# Patient Record
Sex: FEMALE | Race: WHITE | NOT HISPANIC OR LATINO | Employment: PART TIME | URBAN - METROPOLITAN AREA
[De-identification: names, ages, dates, MRNs, and addresses within clinical notes are randomized per-mention and may not be internally consistent; named-entity substitution may affect disease eponyms.]

---

## 2023-08-11 ENCOUNTER — TELEPHONE (OUTPATIENT)
Dept: PRIMARY CARE | Facility: CLINIC | Age: 68
End: 2023-08-11
Payer: COMMERCIAL

## 2023-08-11 DIAGNOSIS — E78.00 ELEVATED LDL CHOLESTEROL LEVEL: ICD-10-CM

## 2023-08-11 DIAGNOSIS — D64.9 ANEMIA, UNSPECIFIED TYPE: ICD-10-CM

## 2023-08-11 PROBLEM — H93.19 TINNITUS: Status: ACTIVE | Noted: 2023-08-11

## 2023-08-11 PROBLEM — J30.9 ALLERGIC RHINITIS: Status: ACTIVE | Noted: 2023-08-11

## 2023-08-11 PROBLEM — R07.89 CHEST PRESSURE: Status: ACTIVE | Noted: 2023-08-11

## 2023-08-11 PROBLEM — R42 VERTIGO: Status: ACTIVE | Noted: 2023-08-11

## 2023-08-11 PROBLEM — I49.3 PREMATURE VENTRICULAR BEATS: Status: ACTIVE | Noted: 2023-08-11

## 2023-08-11 PROBLEM — R03.0 ELEVATED BLOOD PRESSURE READING: Status: ACTIVE | Noted: 2023-08-11

## 2023-08-11 PROBLEM — R51.9 HEADACHE: Status: ACTIVE | Noted: 2023-08-11

## 2023-08-11 PROBLEM — E04.9 THYROID ENLARGEMENT: Status: ACTIVE | Noted: 2023-08-11

## 2023-08-11 PROBLEM — R79.89 LOW TSH LEVEL: Status: ACTIVE | Noted: 2023-08-11

## 2023-08-11 PROBLEM — R09.89 BRUIT OF RIGHT CAROTID ARTERY: Status: ACTIVE | Noted: 2023-08-11

## 2023-08-11 PROBLEM — R00.1 BRADYCARDIA: Status: ACTIVE | Noted: 2023-08-11

## 2023-08-11 PROBLEM — L71.9 ROSACEA, ACNE: Status: ACTIVE | Noted: 2023-08-11

## 2023-08-11 PROBLEM — M71.349 SYNOVIAL CYST OF HAND: Status: ACTIVE | Noted: 2023-08-11

## 2023-08-11 PROBLEM — R76.8 ELEVATED ANTINUCLEAR ANTIBODY (ANA) LEVEL: Status: ACTIVE | Noted: 2023-08-11

## 2023-08-11 PROBLEM — M19.90 INFLAMMATORY ARTHRITIS: Status: ACTIVE | Noted: 2023-08-11

## 2023-08-11 PROBLEM — R00.2 PALPITATIONS: Status: ACTIVE | Noted: 2023-08-11

## 2023-08-11 RX ORDER — LOSARTAN POTASSIUM 100 MG/1
1 TABLET ORAL DAILY
COMMUNITY
Start: 2018-01-11

## 2023-08-11 RX ORDER — FLUTICASONE PROPIONATE 50 MCG
2 SPRAY, SUSPENSION (ML) NASAL DAILY
COMMUNITY
Start: 2017-01-26

## 2023-08-11 RX ORDER — NAPROXEN SODIUM 220 MG/1
1 TABLET, FILM COATED ORAL DAILY PRN
COMMUNITY
Start: 2018-01-19

## 2023-08-11 RX ORDER — ATORVASTATIN CALCIUM 40 MG/1
1 TABLET, FILM COATED ORAL DAILY
COMMUNITY
Start: 2013-11-26 | End: 2023-08-11 | Stop reason: SDUPTHER

## 2023-08-11 RX ORDER — METRONIDAZOLE 10 MG/G
GEL TOPICAL 2 TIMES DAILY
COMMUNITY
Start: 2015-02-19

## 2023-08-11 RX ORDER — METOPROLOL TARTRATE 25 MG/1
TABLET, FILM COATED ORAL
COMMUNITY
Start: 2023-08-08

## 2023-08-11 RX ORDER — ATORVASTATIN CALCIUM 40 MG/1
40 TABLET, FILM COATED ORAL DAILY
Qty: 90 TABLET | Refills: 1 | Status: SHIPPED | OUTPATIENT
Start: 2023-08-11

## 2023-08-11 NOTE — TELEPHONE ENCOUNTER
Pt requesting refill of the following medication. Pt has about 2 weeks of medication left and has scheduled PE appointment for 10/24.      Atorvastatin Calcium  40 MG  1qd  #90  Refill? ?    [facility] # [phone]  [facility] # [phone]

## 2023-10-19 ENCOUNTER — LAB (OUTPATIENT)
Dept: LAB | Facility: LAB | Age: 68
End: 2023-10-19
Payer: COMMERCIAL

## 2023-10-19 DIAGNOSIS — E78.00 ELEVATED LDL CHOLESTEROL LEVEL: ICD-10-CM

## 2023-10-19 DIAGNOSIS — E89.0 POSTPROCEDURAL HYPOTHYROIDISM: Primary | ICD-10-CM

## 2023-10-19 DIAGNOSIS — D64.9 ANEMIA, UNSPECIFIED TYPE: ICD-10-CM

## 2023-10-19 LAB
ALBUMIN SERPL BCP-MCNC: 4.7 G/DL (ref 3.4–5)
ALP SERPL-CCNC: 81 U/L (ref 33–136)
ALT SERPL W P-5'-P-CCNC: 13 U/L (ref 7–45)
ANION GAP SERPL CALC-SCNC: 14 MMOL/L (ref 10–20)
AST SERPL W P-5'-P-CCNC: 18 U/L (ref 9–39)
BASOPHILS # BLD AUTO: 0.07 X10*3/UL (ref 0–0.1)
BASOPHILS NFR BLD AUTO: 1.5 %
BILIRUB SERPL-MCNC: 0.7 MG/DL (ref 0–1.2)
BUN SERPL-MCNC: 18 MG/DL (ref 6–23)
CALCIUM SERPL-MCNC: 9.8 MG/DL (ref 8.6–10.6)
CHLORIDE SERPL-SCNC: 106 MMOL/L (ref 98–107)
CHOLEST SERPL-MCNC: 178 MG/DL (ref 0–199)
CHOLESTEROL/HDL RATIO: 2.3
CO2 SERPL-SCNC: 26 MMOL/L (ref 21–32)
CREAT SERPL-MCNC: 0.78 MG/DL (ref 0.5–1.05)
EOSINOPHIL # BLD AUTO: 0.2 X10*3/UL (ref 0–0.7)
EOSINOPHIL NFR BLD AUTO: 4.4 %
ERYTHROCYTE [DISTWIDTH] IN BLOOD BY AUTOMATED COUNT: 13.4 % (ref 11.5–14.5)
GFR SERPL CREATININE-BSD FRML MDRD: 82 ML/MIN/1.73M*2
GLUCOSE SERPL-MCNC: 85 MG/DL (ref 74–99)
HCT VFR BLD AUTO: 39.9 % (ref 36–46)
HDLC SERPL-MCNC: 76.3 MG/DL
HGB BLD-MCNC: 12.9 G/DL (ref 12–16)
IMM GRANULOCYTES # BLD AUTO: 0.01 X10*3/UL (ref 0–0.7)
IMM GRANULOCYTES NFR BLD AUTO: 0.2 % (ref 0–0.9)
LDLC SERPL CALC-MCNC: 92 MG/DL
LYMPHOCYTES # BLD AUTO: 1.78 X10*3/UL (ref 1.2–4.8)
LYMPHOCYTES NFR BLD AUTO: 38.9 %
MCH RBC QN AUTO: 30 PG (ref 26–34)
MCHC RBC AUTO-ENTMCNC: 32.3 G/DL (ref 32–36)
MCV RBC AUTO: 93 FL (ref 80–100)
MONOCYTES # BLD AUTO: 0.42 X10*3/UL (ref 0.1–1)
MONOCYTES NFR BLD AUTO: 9.2 %
NEUTROPHILS # BLD AUTO: 2.09 X10*3/UL (ref 1.2–7.7)
NEUTROPHILS NFR BLD AUTO: 45.8 %
NON HDL CHOLESTEROL: 102 MG/DL (ref 0–149)
NRBC BLD-RTO: 0 /100 WBCS (ref 0–0)
PLATELET # BLD AUTO: 240 X10*3/UL (ref 150–450)
PMV BLD AUTO: 9.8 FL (ref 7.5–11.5)
POTASSIUM SERPL-SCNC: 4.3 MMOL/L (ref 3.5–5.3)
PROT SERPL-MCNC: 7.3 G/DL (ref 6.4–8.2)
RBC # BLD AUTO: 4.3 X10*6/UL (ref 4–5.2)
SODIUM SERPL-SCNC: 142 MMOL/L (ref 136–145)
T3FREE SERPL-MCNC: 3.4 PG/ML (ref 2.3–4.2)
T4 FREE SERPL-MCNC: 1.19 NG/DL (ref 0.78–1.48)
TRIGL SERPL-MCNC: 49 MG/DL (ref 0–149)
TSH SERPL-ACNC: 0.99 MIU/L (ref 0.44–3.98)
VLDL: 10 MG/DL (ref 0–40)
WBC # BLD AUTO: 4.6 X10*3/UL (ref 4.4–11.3)

## 2023-10-19 PROCEDURE — 85025 COMPLETE CBC W/AUTO DIFF WBC: CPT

## 2023-10-19 PROCEDURE — 84443 ASSAY THYROID STIM HORMONE: CPT

## 2023-10-19 PROCEDURE — 80053 COMPREHEN METABOLIC PANEL: CPT

## 2023-10-19 PROCEDURE — 84439 ASSAY OF FREE THYROXINE: CPT

## 2023-10-19 PROCEDURE — 36415 COLL VENOUS BLD VENIPUNCTURE: CPT

## 2023-10-19 PROCEDURE — 80061 LIPID PANEL: CPT

## 2023-10-19 PROCEDURE — 84481 FREE ASSAY (FT-3): CPT

## 2023-10-24 ENCOUNTER — OFFICE VISIT (OUTPATIENT)
Dept: PRIMARY CARE | Facility: CLINIC | Age: 68
End: 2023-10-24
Payer: COMMERCIAL

## 2023-10-24 ENCOUNTER — HOSPITAL ENCOUNTER (OUTPATIENT)
Dept: RADIOLOGY | Facility: EXTERNAL LOCATION | Age: 68
Discharge: HOME | End: 2023-10-24

## 2023-10-24 VITALS
TEMPERATURE: 97.2 F | HEART RATE: 51 BPM | HEIGHT: 64 IN | BODY MASS INDEX: 32.61 KG/M2 | SYSTOLIC BLOOD PRESSURE: 122 MMHG | WEIGHT: 191 LBS | DIASTOLIC BLOOD PRESSURE: 72 MMHG | OXYGEN SATURATION: 96 %

## 2023-10-24 DIAGNOSIS — Z28.82 MISSED VACCINATION DUE TO PARENT REFUSAL: ICD-10-CM

## 2023-10-24 DIAGNOSIS — I10 PRIMARY HYPERTENSION: Primary | ICD-10-CM

## 2023-10-24 DIAGNOSIS — E78.00 ELEVATED LDL CHOLESTEROL LEVEL: ICD-10-CM

## 2023-10-24 DIAGNOSIS — R00.1 BRADYCARDIA: ICD-10-CM

## 2023-10-24 DIAGNOSIS — Z12.31 SCREENING MAMMOGRAM FOR BREAST CANCER: ICD-10-CM

## 2023-10-24 DIAGNOSIS — R09.89 BRUIT OF RIGHT CAROTID ARTERY: ICD-10-CM

## 2023-10-24 DIAGNOSIS — J30.1 ALLERGIC RHINITIS DUE TO POLLEN, UNSPECIFIED SEASONALITY: ICD-10-CM

## 2023-10-24 DIAGNOSIS — E78.2 MIXED HYPERLIPIDEMIA: ICD-10-CM

## 2023-10-24 DIAGNOSIS — I49.3 PREMATURE VENTRICULAR BEATS: ICD-10-CM

## 2023-10-24 DIAGNOSIS — Z12.11 COLON CANCER SCREENING: ICD-10-CM

## 2023-10-24 DIAGNOSIS — Z23 NEED FOR VACCINATION: ICD-10-CM

## 2023-10-24 DIAGNOSIS — R01.1 HEART MURMUR: ICD-10-CM

## 2023-10-24 DIAGNOSIS — L71.9 ROSACEA, UNSPECIFIED: ICD-10-CM

## 2023-10-24 PROBLEM — L72.3 SEBACEOUS CYST: Status: RESOLVED | Noted: 2019-06-27 | Resolved: 2023-10-24

## 2023-10-24 PROBLEM — H25.10 NUCLEAR SCLEROSIS: Status: ACTIVE | Noted: 2017-03-21

## 2023-10-24 PROBLEM — R07.89 CHEST PRESSURE: Status: RESOLVED | Noted: 2023-08-11 | Resolved: 2023-10-24

## 2023-10-24 PROBLEM — R76.8 POSITIVE ANA (ANTINUCLEAR ANTIBODY): Status: RESOLVED | Noted: 2023-08-11 | Resolved: 2023-10-24

## 2023-10-24 PROBLEM — L29.9 PRURITUS, UNSPECIFIED: Status: RESOLVED | Noted: 2019-06-27 | Resolved: 2023-10-24

## 2023-10-24 PROBLEM — M71.349 SYNOVIAL CYST OF HAND: Status: RESOLVED | Noted: 2023-08-11 | Resolved: 2023-10-24

## 2023-10-24 PROBLEM — D64.9 ANEMIA, UNSPECIFIED: Status: ACTIVE | Noted: 2019-09-11

## 2023-10-24 PROBLEM — L29.9 PRURITUS, UNSPECIFIED: Status: ACTIVE | Noted: 2019-06-27

## 2023-10-24 PROBLEM — L72.3 SEBACEOUS CYST: Status: ACTIVE | Noted: 2019-06-27

## 2023-10-24 PROBLEM — R42 VERTIGO: Status: RESOLVED | Noted: 2023-08-11 | Resolved: 2023-10-24

## 2023-10-24 PROBLEM — E89.0 POSTOPERATIVE HYPOTHYROIDISM: Status: RESOLVED | Noted: 2019-11-06 | Resolved: 2023-10-24

## 2023-10-24 PROBLEM — H93.19 TINNITUS: Status: RESOLVED | Noted: 2023-08-11 | Resolved: 2023-10-24

## 2023-10-24 PROBLEM — R51.9 HEADACHE: Status: RESOLVED | Noted: 2023-08-11 | Resolved: 2023-10-24

## 2023-10-24 PROBLEM — M71.349 OTHER BURSAL CYST, UNSPECIFIED HAND: Status: ACTIVE | Noted: 2019-06-27

## 2023-10-24 PROBLEM — L82.0 INFLAMED SEBORRHEIC KERATOSIS: Status: ACTIVE | Noted: 2019-06-27

## 2023-10-24 PROBLEM — R00.2 PALPITATIONS: Status: RESOLVED | Noted: 2023-08-11 | Resolved: 2023-10-24

## 2023-10-24 PROBLEM — M71.349 OTHER BURSAL CYST, UNSPECIFIED HAND: Status: RESOLVED | Noted: 2019-06-27 | Resolved: 2023-10-24

## 2023-10-24 PROBLEM — R79.89 LOW TSH LEVEL: Status: RESOLVED | Noted: 2023-08-11 | Resolved: 2023-10-24

## 2023-10-24 PROBLEM — E89.0 POSTOPERATIVE HYPOTHYROIDISM: Status: ACTIVE | Noted: 2019-11-06

## 2023-10-24 PROBLEM — E04.1 THYROID NODULE: Status: ACTIVE | Noted: 2019-11-06

## 2023-10-24 PROBLEM — L82.0 INFLAMED SEBORRHEIC KERATOSIS: Status: RESOLVED | Noted: 2019-06-27 | Resolved: 2023-10-24

## 2023-10-24 PROCEDURE — 90471 IMMUNIZATION ADMIN: CPT | Performed by: FAMILY MEDICINE

## 2023-10-24 PROCEDURE — 99397 PER PM REEVAL EST PAT 65+ YR: CPT | Performed by: FAMILY MEDICINE

## 2023-10-24 PROCEDURE — 1159F MED LIST DOCD IN RCRD: CPT | Performed by: FAMILY MEDICINE

## 2023-10-24 PROCEDURE — 90677 PCV20 VACCINE IM: CPT | Performed by: FAMILY MEDICINE

## 2023-10-24 PROCEDURE — 3074F SYST BP LT 130 MM HG: CPT | Performed by: FAMILY MEDICINE

## 2023-10-24 PROCEDURE — 3078F DIAST BP <80 MM HG: CPT | Performed by: FAMILY MEDICINE

## 2023-10-24 PROCEDURE — 1160F RVW MEDS BY RX/DR IN RCRD: CPT | Performed by: FAMILY MEDICINE

## 2023-10-24 PROCEDURE — 1036F TOBACCO NON-USER: CPT | Performed by: FAMILY MEDICINE

## 2023-10-24 RX ORDER — KETOTIFEN FUMARATE 0.35 MG/ML
1 SOLUTION/ DROPS OPHTHALMIC 2 TIMES DAILY
COMMUNITY

## 2023-10-24 RX ORDER — VIT C/E/ZN/COPPR/LUTEIN/ZEAXAN 250MG-90MG
1000 CAPSULE ORAL
COMMUNITY

## 2023-10-24 RX ORDER — LEVOTHYROXINE SODIUM 112 UG/1
1 TABLET ORAL DAILY
COMMUNITY
Start: 2023-01-24

## 2023-10-24 RX ORDER — HYDROCORTISONE 25 MG/G
CREAM TOPICAL
COMMUNITY
Start: 2019-06-27

## 2023-10-24 ASSESSMENT — ENCOUNTER SYMPTOMS
LOSS OF SENSATION IN FEET: 0
ABDOMINAL PAIN: 0
EYE REDNESS: 0
EYE DISCHARGE: 0
TREMORS: 0
VOMITING: 0
NECK PAIN: 0
BACK PAIN: 0
NAUSEA: 0
SLEEP DISTURBANCE: 0
ADENOPATHY: 0
COLOR CHANGE: 0
OCCASIONAL FEELINGS OF UNSTEADINESS: 0
FACIAL ASYMMETRY: 0
FREQUENCY: 0
RHINORRHEA: 0
FATIGUE: 0
SINUS PRESSURE: 0
ABDOMINAL DISTENTION: 0
SORE THROAT: 0
SEIZURES: 0
NUMBNESS: 0
DIFFICULTY URINATING: 0
BRUISES/BLEEDS EASILY: 0
DIAPHORESIS: 0
APPETITE CHANGE: 0
CONFUSION: 0
DIARRHEA: 0
EYE ITCHING: 0
CONSTIPATION: 0
JOINT SWELLING: 0
APNEA: 0
CHEST TIGHTNESS: 0
COUGH: 0
FEVER: 0
POLYDIPSIA: 0
DIZZINESS: 0
SINUS PAIN: 0
MYALGIAS: 0
DEPRESSION: 0
CHILLS: 0
AGITATION: 0
BLOOD IN STOOL: 0
HEADACHES: 0
ARTHRALGIAS: 0
TROUBLE SWALLOWING: 0
ACTIVITY CHANGE: 0
EYE PAIN: 0
PHOTOPHOBIA: 0
CARDIOVASCULAR NEGATIVE: 1
NERVOUS/ANXIOUS: 0

## 2023-10-24 ASSESSMENT — PATIENT HEALTH QUESTIONNAIRE - PHQ9
1. LITTLE INTEREST OR PLEASURE IN DOING THINGS: NOT AT ALL
2. FEELING DOWN, DEPRESSED OR HOPELESS: NOT AT ALL
SUM OF ALL RESPONSES TO PHQ9 QUESTIONS 1 AND 2: 0
10. IF YOU CHECKED OFF ANY PROBLEMS, HOW DIFFICULT HAVE THESE PROBLEMS MADE IT FOR YOU TO DO YOUR WORK, TAKE CARE OF THINGS AT HOME, OR GET ALONG WITH OTHER PEOPLE: NOT DIFFICULT AT ALL

## 2023-10-24 ASSESSMENT — COLUMBIA-SUICIDE SEVERITY RATING SCALE - C-SSRS
1. IN THE PAST MONTH, HAVE YOU WISHED YOU WERE DEAD OR WISHED YOU COULD GO TO SLEEP AND NOT WAKE UP?: NO
6. HAVE YOU EVER DONE ANYTHING, STARTED TO DO ANYTHING, OR PREPARED TO DO ANYTHING TO END YOUR LIFE?: NO
2. HAVE YOU ACTUALLY HAD ANY THOUGHTS OF KILLING YOURSELF?: NO

## 2023-10-24 NOTE — PATIENT INSTRUCTIONS
Noted to have a carotid bruit today.  This has been evaluated in 2019.  Ordering carotid Doppler study.    You are due for your colon cancer screening.  We are ordering your colonoscopy with Dr. Gómez  If troubles making appointment please let us know.    Given information on gynecology to see.    Recommend doing shingles vaccination he states he will do this in the future.    Updating Prevnar 20 today.    Bone density study is going to be done by endocrinology.    Morning mammogram as well.    I recommended doing flu immunization as well but you have refused.    All discussion about COVID vaccination this can be done at the pharmacy.  RSV can be done at the pharmacy.

## 2023-10-24 NOTE — PROGRESS NOTES
Advance Care Planning Note     Discussion Date: 10/24/23   Discussion Participants: patient    The patient wishes to discuss Advance Care Planning today and the following is a brief summary of our discussion.     Patient has capacity to make their own medical decisions: Yes  Health Care Agent/Surrogate Decision Maker documented in chart: No    Documents on file and valid:  Advance Directive/Living Will: Yes   Health Care Power of : Yes  Other: no DNR. Full code.    Communication of Medical Status/Prognosis:   good     Communication of Treatment Goals/Options:   good     Treatment Decisions  good    Time Statement: Total face to face time spent on advance care planning was 16 minutes with 16 minutes spent in counseling, including the explanation.    Greg Nguyen, [employer]   Patient ID: Angelic Langley is a 68 y.o. female who presents for Annual Exam.    Saw NP DR. Moeller come back in a year.  Patient with history of heart murmur and PVCs.    Patient had followed up with cardiology.    Also has been keeping a blood pressure diary they may need to adjust her medications.    Patient has seen thyroid specialist as well.    She denies any troubles with headache or double vision or blurring vision.  No troubles with sore throat or difficulty swallowing.    No troubles with abdominal pain or discomfort.  No troubles with swelling of the legs or feet.    The patient had no vaginal bleeding no rectal bleeding.        Bp Diary    Dr Isaiah Murguia Thyroid specialist         Alcohol intake: 2 glasses a week  Caffeine intake: 2 cups a day  Exercise: walking 2 mile     Last Colonoscopy: Appt dr enriquez  Last Pap smear: ordered  Mammogram:ordered  Last Dexa scan:thru Dr. Murguia    Shingles vaccine: ordered  TdaP vaccine:     Review of Systems   Constitutional:  Negative for activity change, appetite change, chills, diaphoresis, fatigue and fever.   HENT:  Negative for congestion, dental problem, drooling, nosebleeds,  "rhinorrhea, sinus pressure, sinus pain, sneezing, sore throat, tinnitus and trouble swallowing.    Eyes:  Negative for photophobia, pain, discharge, redness, itching and visual disturbance.   Respiratory:  Negative for apnea, cough and chest tightness.    Cardiovascular: Negative.    Gastrointestinal:  Negative for abdominal distention, abdominal pain, blood in stool, constipation, diarrhea, nausea and vomiting.   Endocrine: Negative for cold intolerance, heat intolerance, polydipsia and polyuria.   Genitourinary:  Negative for difficulty urinating, enuresis and frequency.   Musculoskeletal:  Negative for arthralgias, back pain, joint swelling, myalgias and neck pain.   Skin:  Negative for color change and rash.   Allergic/Immunologic: Negative for environmental allergies and food allergies.   Neurological:  Negative for dizziness, tremors, seizures, syncope, facial asymmetry, numbness and headaches.   Hematological:  Negative for adenopathy. Does not bruise/bleed easily.   Psychiatric/Behavioral:  Negative for agitation, behavioral problems, confusion, sleep disturbance and suicidal ideas. The patient is not nervous/anxious.        Objective   /72   Pulse 51   Temp 36.2 °C (97.2 °F)   Ht 1.626 m (5' 4\")   Wt 86.6 kg (191 lb)   SpO2 96%   BMI 32.79 kg/m²   BSA Body surface area is 1.98 meters squared.      Physical Exam  Constitutional:       General: She is not in acute distress.     Appearance: Normal appearance. She is not ill-appearing or diaphoretic.   HENT:      Head: Normocephalic.      Right Ear: Tympanic membrane, ear canal and external ear normal. There is no impacted cerumen.      Left Ear: Tympanic membrane, ear canal and external ear normal. There is no impacted cerumen.      Nose: No congestion or rhinorrhea.      Mouth/Throat:      Pharynx: No oropharyngeal exudate or posterior oropharyngeal erythema.   Eyes:      Conjunctiva/sclera: Conjunctivae normal.      Pupils: Pupils are equal, " round, and reactive to light.   Neck:      Vascular: Carotid bruit present.   Cardiovascular:      Rate and Rhythm: Normal rate and regular rhythm.      Pulses: Normal pulses.      Heart sounds: No murmur heard.     No friction rub.   Pulmonary:      Effort: Pulmonary effort is normal. No respiratory distress.      Breath sounds: Normal breath sounds. No stridor.   Abdominal:      General: There is no distension.      Tenderness: There is no abdominal tenderness. There is no guarding.   Musculoskeletal:         General: No swelling or tenderness.      Cervical back: Normal range of motion.      Right lower leg: No edema.      Left lower leg: No edema.   Lymphadenopathy:      Cervical: No cervical adenopathy.   Skin:     General: Skin is warm.      Coloration: Skin is not pale.      Findings: No rash.   Neurological:      General: No focal deficit present.      Mental Status: She is oriented to person, place, and time.      Cranial Nerves: No cranial nerve deficit.      Sensory: No sensory deficit.      Motor: No weakness.      Coordination: Coordination normal.   Psychiatric:         Mood and Affect: Mood normal.         Behavior: Behavior normal.         Judgment: Judgment normal.     No thyroid enlargement noted.    No breast lumps tenderness masses or nipple discharge  Lab on 10/19/2023   Component Date Value Ref Range Status    WBC 10/19/2023 4.6  4.4 - 11.3 x10*3/uL Final    nRBC 10/19/2023 0.0  0.0 - 0.0 /100 WBCs Final    RBC 10/19/2023 4.30  4.00 - 5.20 x10*6/uL Final    Hemoglobin 10/19/2023 12.9  12.0 - 16.0 g/dL Final    Hematocrit 10/19/2023 39.9  36.0 - 46.0 % Final    MCV 10/19/2023 93  80 - 100 fL Final    MCH 10/19/2023 30.0  26.0 - 34.0 pg Final    MCHC 10/19/2023 32.3  32.0 - 36.0 g/dL Final    RDW 10/19/2023 13.4  11.5 - 14.5 % Final    Platelets 10/19/2023 240  150 - 450 x10*3/uL Final    MPV 10/19/2023 9.8  7.5 - 11.5 fL Final    Neutrophils % 10/19/2023 45.8  40.0 - 80.0 % Final    Immature  Granulocytes %, Automated 10/19/2023 0.2  0.0 - 0.9 % Final    Immature Granulocyte Count (IG) includes promyelocytes, myelocytes and metamyelocytes but does not include bands. Percent differential counts (%) should be interpreted in the context of the absolute cell counts (cells/UL).    Lymphocytes % 10/19/2023 38.9  13.0 - 44.0 % Final    Monocytes % 10/19/2023 9.2  2.0 - 10.0 % Final    Eosinophils % 10/19/2023 4.4  0.0 - 6.0 % Final    Basophils % 10/19/2023 1.5  0.0 - 2.0 % Final    Neutrophils Absolute 10/19/2023 2.09  1.20 - 7.70 x10*3/uL Final    Percent differential counts (%) should be interpreted in the context of the absolute cell counts (cells/uL).    Immature Granulocytes Absolute, Au* 10/19/2023 0.01  0.00 - 0.70 x10*3/uL Final    Lymphocytes Absolute 10/19/2023 1.78  1.20 - 4.80 x10*3/uL Final    Monocytes Absolute 10/19/2023 0.42  0.10 - 1.00 x10*3/uL Final    Eosinophils Absolute 10/19/2023 0.20  0.00 - 0.70 x10*3/uL Final    Basophils Absolute 10/19/2023 0.07  0.00 - 0.10 x10*3/uL Final    Glucose 10/19/2023 85  74 - 99 mg/dL Final    Sodium 10/19/2023 142  136 - 145 mmol/L Final    Potassium 10/19/2023 4.3  3.5 - 5.3 mmol/L Final    Chloride 10/19/2023 106  98 - 107 mmol/L Final    Bicarbonate 10/19/2023 26  21 - 32 mmol/L Final    Anion Gap 10/19/2023 14  10 - 20 mmol/L Final    Urea Nitrogen 10/19/2023 18  6 - 23 mg/dL Final    Creatinine 10/19/2023 0.78  0.50 - 1.05 mg/dL Final    eGFR 10/19/2023 82  >60 mL/min/1.73m*2 Final    Calculations of estimated GFR are performed using the 2021 CKD-EPI Study Refit equation without the race variable for the IDMS-Traceable creatinine methods.  https://jasn.asnjournals.org/content/early/2021/09/22/ASN.6816278812    Calcium 10/19/2023 9.8  8.6 - 10.6 mg/dL Final    Albumin 10/19/2023 4.7  3.4 - 5.0 g/dL Final    Alkaline Phosphatase 10/19/2023 81  33 - 136 U/L Final    Total Protein 10/19/2023 7.3  6.4 - 8.2 g/dL Final    AST 10/19/2023 18  9 - 39 U/L  Final    Bilirubin, Total 10/19/2023 0.7  0.0 - 1.2 mg/dL Final    ALT 10/19/2023 13  7 - 45 U/L Final    Patients treated with Sulfasalazine may generate falsely decreased results for ALT.    Cholesterol 10/19/2023 178  0 - 199 mg/dL Final          Age      Desirable   Borderline High   High     0-19 Y     0 - 169       170 - 199     >/= 200    20-24 Y     0 - 189       190 - 224     >/= 225         >24 Y     0 - 199       200 - 239     >/= 240   **All ranges are based on fasting samples. Specific   therapeutic targets will vary based on patient-specific   cardiac risk.    Pediatric guidelines reference:Pediatrics 2011, 128(S5).Adult guidelines reference: NCEP ATPIII Guidelines,MEY 2001, 258:2486-97    Venipuncture immediately after or during the administration of Metamizole may lead to falsely low results. Testing should be performed immediately prior to Metamizole dosing.    HDL-Cholesterol 10/19/2023 76.3  mg/dL Final      Age       Very Low   Low     Normal    High    0-19 Y    < 35      < 40     40-45     ----  20-24 Y    ----     < 40      >45      ----        >24 Y      ----     < 40     40-60      >60      Cholesterol/HDL Ratio 10/19/2023 2.3   Final      Ref Values  Desirable  < 3.4  High Risk  > 5.0    LDL Calculated 10/19/2023 92  <=99 mg/dL Final                                Near   Borderline      AGE      Desirable  Optimal    High     High     Very High     0-19 Y     0 - 109     ---    110-129   >/= 130     ----    20-24 Y     0 - 119     ---    120-159   >/= 160     ----      >24 Y     0 -  99   100-129  130-159   160-189     >/=190      VLDL 10/19/2023 10  0 - 40 mg/dL Final    Triglycerides 10/19/2023 49  0 - 149 mg/dL Final       Age         Desirable   Borderline High   High     Very High   0 D-90 D    19 - 174         ----         ----        ----  91 D- 9 Y     0 -  74        75 -  99     >/= 100      ----    10-19 Y     0 -  89        90 - 129     >/= 130      ----    20-24 Y     0 - 114        115 - 149     >/= 150      ----         >24 Y     0 - 149       150 - 199    200- 499    >/= 500    Venipuncture immediately after or during the administration of Metamizole may lead to falsely low results. Testing should be performed immediately prior to Metamizole dosing.    Non HDL Cholesterol 10/19/2023 102  0 - 149 mg/dL Final          Age       Desirable   Borderline High   High     Very High     0-19 Y     0 - 119       120 - 144     >/= 145    >/= 160    20-24 Y     0 - 149       150 - 189     >/= 190      ----         >24 Y    30 mg/dL above LDL Cholesterol goal      Thyroid Stimulating Hormone 10/19/2023 0.99  0.44 - 3.98 mIU/L Final    Thyroxine, Free 10/19/2023 1.19  0.78 - 1.48 ng/dL Final    Triiodothyronine, Free 10/19/2023 3.4  2.3 - 4.2 pg/mL Final     Current Outpatient Medications on File Prior to Visit   Medication Sig Dispense Refill    ascorbic acid/zinc (ZINC SULFATE-VITAMIN C ORAL) Take by mouth.      aspirin 81 mg chewable tablet Chew 1 tablet (81 mg) once daily as needed.      atorvastatin (Lipitor) 40 mg tablet Take 1 tablet (40 mg) by mouth once daily. 90 tablet 1    cholecalciferol (Vitamin D-3) 25 MCG (1000 UT) capsule Take 1 capsule (25 mcg) by mouth once daily.      fluticasone (Flonase) 50 mcg/actuation nasal spray Administer 2 sprays into affected nostril(s) once daily.      hydrocortisone 2.5 % cream 1 Application      IVERMECTIN ORAL Take 18 mg by mouth.      ketotifen (Zaditor) 0.025 % (0.035 %) ophthalmic solution Administer 1 drop into affected eye(s) 2 times a day.      levothyroxine (Synthroid, Levoxyl) 112 mcg tablet Take 1 tablet (112 mcg) by mouth once daily.      losartan (Cozaar) 100 mg tablet Take 1 tablet (100 mg) by mouth once daily.      metoprolol tartrate (Lopressor) 25 mg tablet       metroNIDAZOLE (Metrogel) 1 % gel twice a day.       No current facility-administered medications on file prior to visit.     No images are attached to the encounter.             Assessment/Plan   Problem List Items Addressed This Visit             ICD-10-CM    Allergic rhinitis J30.9     Doing well using antihistamine from time to time.         Bradycardia R00.1     Please continue to follow-up with cardiology regarding bradycardia and blood pressure         Bruit of right carotid artery R09.89     Evaluating for right carotid bruit.  Ultrasound being performed         Elevated LDL cholesterol level E78.00     Cholesterol levels well controlled remains on atorvastatin therapy         Premature ventricular beats I49.3     This has been stable continue metoprolol therapy         Rosacea, unspecified L71.9     Please continue with MetroCream and continue to work with dermatology regarding rosacea         Heart murmur R01.1     Echo has been performed continue follow-up with cardiology         Mixed hyperlipidemia E78.2    Primary hypertension - Primary I10     Blood pressure well controlled today on losartan and metoprolol         Missed vaccination due to parent refusal Z28.82

## 2023-11-01 ENCOUNTER — ANCILLARY PROCEDURE (OUTPATIENT)
Dept: RADIOLOGY | Facility: CLINIC | Age: 68
End: 2023-11-01
Payer: COMMERCIAL

## 2023-11-01 DIAGNOSIS — R09.89 BRUIT OF RIGHT CAROTID ARTERY: ICD-10-CM

## 2023-11-01 PROCEDURE — 93880 EXTRACRANIAL BILAT STUDY: CPT | Performed by: RADIOLOGY

## 2023-11-01 PROCEDURE — 93880 EXTRACRANIAL BILAT STUDY: CPT

## 2024-01-24 ENCOUNTER — OFFICE VISIT (OUTPATIENT)
Dept: PRIMARY CARE | Facility: CLINIC | Age: 69
End: 2024-01-24
Payer: COMMERCIAL

## 2024-01-24 VITALS
WEIGHT: 192.4 LBS | DIASTOLIC BLOOD PRESSURE: 80 MMHG | TEMPERATURE: 97.9 F | BODY MASS INDEX: 33.03 KG/M2 | SYSTOLIC BLOOD PRESSURE: 122 MMHG | HEART RATE: 51 BPM

## 2024-01-24 DIAGNOSIS — B02.8 HERPES ZOSTER WITH COMPLICATION: Primary | ICD-10-CM

## 2024-01-24 PROCEDURE — 1159F MED LIST DOCD IN RCRD: CPT | Performed by: FAMILY MEDICINE

## 2024-01-24 PROCEDURE — 3079F DIAST BP 80-89 MM HG: CPT | Performed by: FAMILY MEDICINE

## 2024-01-24 PROCEDURE — 3074F SYST BP LT 130 MM HG: CPT | Performed by: FAMILY MEDICINE

## 2024-01-24 PROCEDURE — 1036F TOBACCO NON-USER: CPT | Performed by: FAMILY MEDICINE

## 2024-01-24 PROCEDURE — 1160F RVW MEDS BY RX/DR IN RCRD: CPT | Performed by: FAMILY MEDICINE

## 2024-01-24 PROCEDURE — 99214 OFFICE O/P EST MOD 30 MIN: CPT | Performed by: FAMILY MEDICINE

## 2024-01-24 RX ORDER — VALACYCLOVIR HYDROCHLORIDE 1 G/1
1000 TABLET, FILM COATED ORAL 3 TIMES DAILY
Qty: 21 TABLET | Refills: 0 | Status: SHIPPED | OUTPATIENT
Start: 2024-01-24 | End: 2024-01-31

## 2024-01-24 ASSESSMENT — ENCOUNTER SYMPTOMS
ARTHRALGIAS: 0
CHEST TIGHTNESS: 0
COLOR CHANGE: 0
BACK PAIN: 0
COUGH: 0
FACIAL ASYMMETRY: 0
HEADACHES: 0
DIZZINESS: 0
FEVER: 0
FATIGUE: 0
CHOKING: 0
LIGHT-HEADEDNESS: 0
ACTIVITY CHANGE: 0
APPETITE CHANGE: 0
PALPITATIONS: 0

## 2024-01-24 NOTE — PROGRESS NOTES
"Subjective   Patient ID: Angelic Langley \"Diamante" is a 69 y.o. female who presents for Itchy red tingling and swelling right side of face. X yeste.    HPI  Patient reports she is having difficulty with itchy red tingling and swelling on the right side of her face since yesterday.  She notes 3 areas.  She is allergic to cats and was rubbing her eyes after touching the other day.  She thinks maybe this is hives.    Review of Systems   Constitutional:  Negative for activity change, appetite change, fatigue and fever.   HENT:  Negative for congestion.    Respiratory:  Negative for cough, choking and chest tightness.    Cardiovascular:  Negative for chest pain, palpitations and leg swelling.   Musculoskeletal:  Negative for arthralgias, back pain and gait problem.   Skin:  Positive for rash. Negative for color change and pallor.   Neurological:  Negative for dizziness, facial asymmetry, light-headedness and headaches.       Objective   /80 (BP Location: Right arm)   Pulse 51   Temp 36.6 °C (97.9 °F)   Wt 87.3 kg (192 lb 6.4 oz)   BMI 33.03 kg/m²   BSA Body surface area is 1.99 meters squared.      Physical Exam  Constitutional:       General: She is not in acute distress.     Appearance: Normal appearance. She is not toxic-appearing.   HENT:      Head: Normocephalic.   Eyes:      Conjunctiva/sclera: Conjunctivae normal.      Pupils: Pupils are equal, round, and reactive to light.   Cardiovascular:      Rate and Rhythm: Normal rate and regular rhythm.      Pulses: Normal pulses.      Heart sounds: Normal heart sounds.   Pulmonary:      Effort: No respiratory distress.      Breath sounds: No wheezing, rhonchi or rales.   Musculoskeletal:         General: No swelling or tenderness.   Skin:     Findings: Rash present. No lesion.             Comments: Erythema and swelling around forehead underneath the eye and around lower right eyelid on right chin   Neurological:      General: No focal deficit present.      " Mental Status: She is alert and oriented to person, place, and time. Mental status is at baseline.      Gait: Gait normal.   Psychiatric:         Mood and Affect: Mood normal.         Behavior: Behavior normal.         Thought Content: Thought content normal.         Judgment: Judgment normal.       Lab on 10/19/2023   Component Date Value Ref Range Status    WBC 10/19/2023 4.6  4.4 - 11.3 x10*3/uL Final    nRBC 10/19/2023 0.0  0.0 - 0.0 /100 WBCs Final    RBC 10/19/2023 4.30  4.00 - 5.20 x10*6/uL Final    Hemoglobin 10/19/2023 12.9  12.0 - 16.0 g/dL Final    Hematocrit 10/19/2023 39.9  36.0 - 46.0 % Final    MCV 10/19/2023 93  80 - 100 fL Final    MCH 10/19/2023 30.0  26.0 - 34.0 pg Final    MCHC 10/19/2023 32.3  32.0 - 36.0 g/dL Final    RDW 10/19/2023 13.4  11.5 - 14.5 % Final    Platelets 10/19/2023 240  150 - 450 x10*3/uL Final    MPV 10/19/2023 9.8  7.5 - 11.5 fL Final    Neutrophils % 10/19/2023 45.8  40.0 - 80.0 % Final    Immature Granulocytes %, Automated 10/19/2023 0.2  0.0 - 0.9 % Final    Immature Granulocyte Count (IG) includes promyelocytes, myelocytes and metamyelocytes but does not include bands. Percent differential counts (%) should be interpreted in the context of the absolute cell counts (cells/UL).    Lymphocytes % 10/19/2023 38.9  13.0 - 44.0 % Final    Monocytes % 10/19/2023 9.2  2.0 - 10.0 % Final    Eosinophils % 10/19/2023 4.4  0.0 - 6.0 % Final    Basophils % 10/19/2023 1.5  0.0 - 2.0 % Final    Neutrophils Absolute 10/19/2023 2.09  1.20 - 7.70 x10*3/uL Final    Percent differential counts (%) should be interpreted in the context of the absolute cell counts (cells/uL).    Immature Granulocytes Absolute, Au* 10/19/2023 0.01  0.00 - 0.70 x10*3/uL Final    Lymphocytes Absolute 10/19/2023 1.78  1.20 - 4.80 x10*3/uL Final    Monocytes Absolute 10/19/2023 0.42  0.10 - 1.00 x10*3/uL Final    Eosinophils Absolute 10/19/2023 0.20  0.00 - 0.70 x10*3/uL Final    Basophils Absolute 10/19/2023 0.07   0.00 - 0.10 x10*3/uL Final    Glucose 10/19/2023 85  74 - 99 mg/dL Final    Sodium 10/19/2023 142  136 - 145 mmol/L Final    Potassium 10/19/2023 4.3  3.5 - 5.3 mmol/L Final    Chloride 10/19/2023 106  98 - 107 mmol/L Final    Bicarbonate 10/19/2023 26  21 - 32 mmol/L Final    Anion Gap 10/19/2023 14  10 - 20 mmol/L Final    Urea Nitrogen 10/19/2023 18  6 - 23 mg/dL Final    Creatinine 10/19/2023 0.78  0.50 - 1.05 mg/dL Final    eGFR 10/19/2023 82  >60 mL/min/1.73m*2 Final    Calculations of estimated GFR are performed using the 2021 CKD-EPI Study Refit equation without the race variable for the IDMS-Traceable creatinine methods.  https://jasn.asnjournals.org/content/early/2021/09/22/ASN.9213384980    Calcium 10/19/2023 9.8  8.6 - 10.6 mg/dL Final    Albumin 10/19/2023 4.7  3.4 - 5.0 g/dL Final    Alkaline Phosphatase 10/19/2023 81  33 - 136 U/L Final    Total Protein 10/19/2023 7.3  6.4 - 8.2 g/dL Final    AST 10/19/2023 18  9 - 39 U/L Final    Bilirubin, Total 10/19/2023 0.7  0.0 - 1.2 mg/dL Final    ALT 10/19/2023 13  7 - 45 U/L Final    Patients treated with Sulfasalazine may generate falsely decreased results for ALT.    Cholesterol 10/19/2023 178  0 - 199 mg/dL Final          Age      Desirable   Borderline High   High     0-19 Y     0 - 169       170 - 199     >/= 200    20-24 Y     0 - 189       190 - 224     >/= 225         >24 Y     0 - 199       200 - 239     >/= 240   **All ranges are based on fasting samples. Specific   therapeutic targets will vary based on patient-specific   cardiac risk.    Pediatric guidelines reference:Pediatrics 2011, 128(S5).Adult guidelines reference: NCEP ATPIII Guidelines,MEY 2001, 258:2486-97    Venipuncture immediately after or during the administration of Metamizole may lead to falsely low results. Testing should be performed immediately prior to Metamizole dosing.    HDL-Cholesterol 10/19/2023 76.3  mg/dL Final      Age       Very Low   Low     Normal    High    0-19 Y     < 35      < 40     40-45     ----  20-24 Y    ----     < 40      >45      ----        >24 Y      ----     < 40     40-60      >60      Cholesterol/HDL Ratio 10/19/2023 2.3   Final      Ref Values  Desirable  < 3.4  High Risk  > 5.0    LDL Calculated 10/19/2023 92  <=99 mg/dL Final                                Near   Borderline      AGE      Desirable  Optimal    High     High     Very High     0-19 Y     0 - 109     ---    110-129   >/= 130     ----    20-24 Y     0 - 119     ---    120-159   >/= 160     ----      >24 Y     0 -  99   100-129  130-159   160-189     >/=190      VLDL 10/19/2023 10  0 - 40 mg/dL Final    Triglycerides 10/19/2023 49  0 - 149 mg/dL Final       Age         Desirable   Borderline High   High     Very High   0 D-90 D    19 - 174         ----         ----        ----  91 D- 9 Y     0 -  74        75 -  99     >/= 100      ----    10-19 Y     0 -  89        90 - 129     >/= 130      ----    20-24 Y     0 - 114       115 - 149     >/= 150      ----         >24 Y     0 - 149       150 - 199    200- 499    >/= 500    Venipuncture immediately after or during the administration of Metamizole may lead to falsely low results. Testing should be performed immediately prior to Metamizole dosing.    Non HDL Cholesterol 10/19/2023 102  0 - 149 mg/dL Final          Age       Desirable   Borderline High   High     Very High     0-19 Y     0 - 119       120 - 144     >/= 145    >/= 160    20-24 Y     0 - 149       150 - 189     >/= 190      ----         >24 Y    30 mg/dL above LDL Cholesterol goal      Thyroid Stimulating Hormone 10/19/2023 0.99  0.44 - 3.98 mIU/L Final    Thyroxine, Free 10/19/2023 1.19  0.78 - 1.48 ng/dL Final    Triiodothyronine, Free 10/19/2023 3.4  2.3 - 4.2 pg/mL Final     Current Outpatient Medications on File Prior to Visit   Medication Sig Dispense Refill    ascorbic acid/zinc (ZINC SULFATE-VITAMIN C ORAL) Take by mouth.      aspirin 81 mg chewable tablet Chew 1 tablet (81 mg)  once daily as needed.      atorvastatin (Lipitor) 40 mg tablet Take 1 tablet (40 mg) by mouth once daily. 90 tablet 1    cholecalciferol (Vitamin D-3) 25 MCG (1000 UT) capsule Take 1 capsule (25 mcg) by mouth once daily.      fluticasone (Flonase) 50 mcg/actuation nasal spray Administer 2 sprays into affected nostril(s) once daily.      hydrocortisone 2.5 % cream 1 Application      ketotifen (Zaditor) 0.025 % (0.035 %) ophthalmic solution Administer 1 drop into affected eye(s) 2 times a day.      levothyroxine (Synthroid, Levoxyl) 112 mcg tablet Take 1 tablet (112 mcg) by mouth once daily.      losartan (Cozaar) 100 mg tablet Take 1 tablet (100 mg) by mouth once daily.      metoprolol tartrate (Lopressor) 25 mg tablet       metroNIDAZOLE (Metrogel) 1 % gel twice a day.      IVERMECTIN ORAL Take 18 mg by mouth.       No current facility-administered medications on file prior to visit.     No images are attached to the encounter.            Assessment/Plan   Diagnoses and all orders for this visit:  Herpes zoster with complication  1.  Patient discussion that this could be a flare of ocular rosacea versus the beginning of preseptal cellulitis versus shingles we are going to start her on Valtrex and see her on Friday  2.  Patient will get into see ophthalmology for possibility that this could be shingles of the eye  3.  Patient to call for questions or concerns

## 2024-01-26 ENCOUNTER — OFFICE VISIT (OUTPATIENT)
Dept: PRIMARY CARE | Facility: CLINIC | Age: 69
End: 2024-01-26
Payer: COMMERCIAL

## 2024-01-26 VITALS
SYSTOLIC BLOOD PRESSURE: 120 MMHG | DIASTOLIC BLOOD PRESSURE: 62 MMHG | TEMPERATURE: 97.4 F | BODY MASS INDEX: 33.06 KG/M2 | HEART RATE: 57 BPM | WEIGHT: 192.6 LBS

## 2024-01-26 DIAGNOSIS — J20.9 ACUTE BRONCHITIS, UNSPECIFIED ORGANISM: Primary | ICD-10-CM

## 2024-01-26 DIAGNOSIS — L03.211 CELLULITIS OF FACE: ICD-10-CM

## 2024-01-26 PROCEDURE — 1036F TOBACCO NON-USER: CPT | Performed by: FAMILY MEDICINE

## 2024-01-26 PROCEDURE — 99214 OFFICE O/P EST MOD 30 MIN: CPT | Performed by: FAMILY MEDICINE

## 2024-01-26 PROCEDURE — 3078F DIAST BP <80 MM HG: CPT | Performed by: FAMILY MEDICINE

## 2024-01-26 PROCEDURE — 3074F SYST BP LT 130 MM HG: CPT | Performed by: FAMILY MEDICINE

## 2024-01-26 PROCEDURE — 1159F MED LIST DOCD IN RCRD: CPT | Performed by: FAMILY MEDICINE

## 2024-01-26 PROCEDURE — 1160F RVW MEDS BY RX/DR IN RCRD: CPT | Performed by: FAMILY MEDICINE

## 2024-01-26 RX ORDER — DOXYCYCLINE 100 MG/1
100 CAPSULE ORAL 2 TIMES DAILY
Qty: 20 CAPSULE | Refills: 0 | Status: SHIPPED | OUTPATIENT
Start: 2024-01-26 | End: 2024-02-05

## 2024-01-26 RX ORDER — ERYTHROMYCIN 5 MG/G
1 OINTMENT OPHTHALMIC
COMMUNITY
Start: 2024-01-26

## 2024-01-26 ASSESSMENT — ENCOUNTER SYMPTOMS
FACIAL ASYMMETRY: 0
COUGH: 0
HEADACHES: 0
LIGHT-HEADEDNESS: 0
BACK PAIN: 0
CHOKING: 0
ARTHRALGIAS: 0
FATIGUE: 0
PALPITATIONS: 0
APPETITE CHANGE: 0
CHEST TIGHTNESS: 0
COLOR CHANGE: 0
ACTIVITY CHANGE: 0
FEVER: 0
DIZZINESS: 0

## 2024-01-26 NOTE — PROGRESS NOTES
"Subjective   Patient ID: Angelic Langley \"Diamante" is a 69 y.o. female who presents for Follow-up ([facility]).    HPI  Patient reports she is having difficulty with itchy red tingling and swelling on the right side of her face since yesterday.  She notes 3 areas.  She is allergic to cats and was rubbing her eyes after touching the other day.  She thinks maybe this is hives.    Review of Systems   Constitutional:  Negative for activity change, appetite change, fatigue and fever.   HENT:  Negative for congestion.    Respiratory:  Negative for cough, choking and chest tightness.    Cardiovascular:  Negative for chest pain, palpitations and leg swelling.   Musculoskeletal:  Negative for arthralgias, back pain and gait problem.   Skin:  Positive for rash. Negative for color change and pallor.   Neurological:  Negative for dizziness, facial asymmetry, light-headedness and headaches.       Objective   /62 (BP Location: Left arm)   Pulse 57   Temp 36.3 °C (97.4 °F)   Wt 87.4 kg (192 lb 9.6 oz)   BMI 33.06 kg/m²   BSA Body surface area is 1.99 meters squared.      Physical Exam  Constitutional:       General: She is not in acute distress.     Appearance: Normal appearance. She is not toxic-appearing.   HENT:      Head: Normocephalic.   Eyes:      Conjunctiva/sclera: Conjunctivae normal.      Pupils: Pupils are equal, round, and reactive to light.   Cardiovascular:      Rate and Rhythm: Normal rate and regular rhythm.      Pulses: Normal pulses.      Heart sounds: Normal heart sounds.   Pulmonary:      Effort: No respiratory distress.      Breath sounds: No wheezing, rhonchi or rales.   Musculoskeletal:         General: No swelling or tenderness.   Skin:     Findings: Rash present. No lesion.             Comments: Erythema and swelling around forehead underneath the eye and around lower right eyelid on right chin   Neurological:      General: No focal deficit present.      Mental Status: She is alert and oriented to " person, place, and time. Mental status is at baseline.      Gait: Gait normal.   Psychiatric:         Mood and Affect: Mood normal.         Behavior: Behavior normal.         Thought Content: Thought content normal.         Judgment: Judgment normal.       Lab on 10/19/2023   Component Date Value Ref Range Status    WBC 10/19/2023 4.6  4.4 - 11.3 x10*3/uL Final    nRBC 10/19/2023 0.0  0.0 - 0.0 /100 WBCs Final    RBC 10/19/2023 4.30  4.00 - 5.20 x10*6/uL Final    Hemoglobin 10/19/2023 12.9  12.0 - 16.0 g/dL Final    Hematocrit 10/19/2023 39.9  36.0 - 46.0 % Final    MCV 10/19/2023 93  80 - 100 fL Final    MCH 10/19/2023 30.0  26.0 - 34.0 pg Final    MCHC 10/19/2023 32.3  32.0 - 36.0 g/dL Final    RDW 10/19/2023 13.4  11.5 - 14.5 % Final    Platelets 10/19/2023 240  150 - 450 x10*3/uL Final    MPV 10/19/2023 9.8  7.5 - 11.5 fL Final    Neutrophils % 10/19/2023 45.8  40.0 - 80.0 % Final    Immature Granulocytes %, Automated 10/19/2023 0.2  0.0 - 0.9 % Final    Immature Granulocyte Count (IG) includes promyelocytes, myelocytes and metamyelocytes but does not include bands. Percent differential counts (%) should be interpreted in the context of the absolute cell counts (cells/UL).    Lymphocytes % 10/19/2023 38.9  13.0 - 44.0 % Final    Monocytes % 10/19/2023 9.2  2.0 - 10.0 % Final    Eosinophils % 10/19/2023 4.4  0.0 - 6.0 % Final    Basophils % 10/19/2023 1.5  0.0 - 2.0 % Final    Neutrophils Absolute 10/19/2023 2.09  1.20 - 7.70 x10*3/uL Final    Percent differential counts (%) should be interpreted in the context of the absolute cell counts (cells/uL).    Immature Granulocytes Absolute, Au* 10/19/2023 0.01  0.00 - 0.70 x10*3/uL Final    Lymphocytes Absolute 10/19/2023 1.78  1.20 - 4.80 x10*3/uL Final    Monocytes Absolute 10/19/2023 0.42  0.10 - 1.00 x10*3/uL Final    Eosinophils Absolute 10/19/2023 0.20  0.00 - 0.70 x10*3/uL Final    Basophils Absolute 10/19/2023 0.07  0.00 - 0.10 x10*3/uL Final    Glucose  10/19/2023 85  74 - 99 mg/dL Final    Sodium 10/19/2023 142  136 - 145 mmol/L Final    Potassium 10/19/2023 4.3  3.5 - 5.3 mmol/L Final    Chloride 10/19/2023 106  98 - 107 mmol/L Final    Bicarbonate 10/19/2023 26  21 - 32 mmol/L Final    Anion Gap 10/19/2023 14  10 - 20 mmol/L Final    Urea Nitrogen 10/19/2023 18  6 - 23 mg/dL Final    Creatinine 10/19/2023 0.78  0.50 - 1.05 mg/dL Final    eGFR 10/19/2023 82  >60 mL/min/1.73m*2 Final    Calculations of estimated GFR are performed using the 2021 CKD-EPI Study Refit equation without the race variable for the IDMS-Traceable creatinine methods.  https://jasn.asnjournals.org/content/early/2021/09/22/ASN.9401740048    Calcium 10/19/2023 9.8  8.6 - 10.6 mg/dL Final    Albumin 10/19/2023 4.7  3.4 - 5.0 g/dL Final    Alkaline Phosphatase 10/19/2023 81  33 - 136 U/L Final    Total Protein 10/19/2023 7.3  6.4 - 8.2 g/dL Final    AST 10/19/2023 18  9 - 39 U/L Final    Bilirubin, Total 10/19/2023 0.7  0.0 - 1.2 mg/dL Final    ALT 10/19/2023 13  7 - 45 U/L Final    Patients treated with Sulfasalazine may generate falsely decreased results for ALT.    Cholesterol 10/19/2023 178  0 - 199 mg/dL Final          Age      Desirable   Borderline High   High     0-19 Y     0 - 169       170 - 199     >/= 200    20-24 Y     0 - 189       190 - 224     >/= 225         >24 Y     0 - 199       200 - 239     >/= 240   **All ranges are based on fasting samples. Specific   therapeutic targets will vary based on patient-specific   cardiac risk.    Pediatric guidelines reference:Pediatrics 2011, 128(S5).Adult guidelines reference: NCEP ATPIII Guidelines,MEY 2001, 258:2486-97    Venipuncture immediately after or during the administration of Metamizole may lead to falsely low results. Testing should be performed immediately prior to Metamizole dosing.    HDL-Cholesterol 10/19/2023 76.3  mg/dL Final      Age       Very Low   Low     Normal    High    0-19 Y    < 35      < 40     40-45      ----  20-24 Y    ----     < 40      >45      ----        >24 Y      ----     < 40     40-60      >60      Cholesterol/HDL Ratio 10/19/2023 2.3   Final      Ref Values  Desirable  < 3.4  High Risk  > 5.0    LDL Calculated 10/19/2023 92  <=99 mg/dL Final                                Near   Borderline      AGE      Desirable  Optimal    High     High     Very High     0-19 Y     0 - 109     ---    110-129   >/= 130     ----    20-24 Y     0 - 119     ---    120-159   >/= 160     ----      >24 Y     0 -  99   100-129  130-159   160-189     >/=190      VLDL 10/19/2023 10  0 - 40 mg/dL Final    Triglycerides 10/19/2023 49  0 - 149 mg/dL Final       Age         Desirable   Borderline High   High     Very High   0 D-90 D    19 - 174         ----         ----        ----  91 D- 9 Y     0 -  74        75 -  99     >/= 100      ----    10-19 Y     0 -  89        90 - 129     >/= 130      ----    20-24 Y     0 - 114       115 - 149     >/= 150      ----         >24 Y     0 - 149       150 - 199    200- 499    >/= 500    Venipuncture immediately after or during the administration of Metamizole may lead to falsely low results. Testing should be performed immediately prior to Metamizole dosing.    Non HDL Cholesterol 10/19/2023 102  0 - 149 mg/dL Final          Age       Desirable   Borderline High   High     Very High     0-19 Y     0 - 119       120 - 144     >/= 145    >/= 160    20-24 Y     0 - 149       150 - 189     >/= 190      ----         >24 Y    30 mg/dL above LDL Cholesterol goal      Thyroid Stimulating Hormone 10/19/2023 0.99  0.44 - 3.98 mIU/L Final    Thyroxine, Free 10/19/2023 1.19  0.78 - 1.48 ng/dL Final    Triiodothyronine, Free 10/19/2023 3.4  2.3 - 4.2 pg/mL Final     Current Outpatient Medications on File Prior to Visit   Medication Sig Dispense Refill    ascorbic acid/zinc (ZINC SULFATE-VITAMIN C ORAL) Take by mouth.      aspirin 81 mg chewable tablet Chew 1 tablet (81 mg) once daily as needed.       atorvastatin (Lipitor) 40 mg tablet Take 1 tablet (40 mg) by mouth once daily. 90 tablet 1    cholecalciferol (Vitamin D-3) 25 MCG (1000 UT) capsule Take 1 capsule (25 mcg) by mouth once daily.      erythromycin (Romycin) 5 mg/gram (0.5 %) ophthalmic ointment Apply 1 Application to affected eye(s).      fluticasone (Flonase) 50 mcg/actuation nasal spray Administer 2 sprays into affected nostril(s) once daily.      hydrocortisone 2.5 % cream 1 Application      IVERMECTIN ORAL Take 18 mg by mouth.      ketotifen (Zaditor) 0.025 % (0.035 %) ophthalmic solution Administer 1 drop into affected eye(s) 2 times a day.      levothyroxine (Synthroid, Levoxyl) 112 mcg tablet Take 1 tablet (112 mcg) by mouth once daily.      losartan (Cozaar) 100 mg tablet Take 1 tablet (100 mg) by mouth once daily.      metoprolol tartrate (Lopressor) 25 mg tablet       metroNIDAZOLE (Metrogel) 1 % gel twice a day.      valACYclovir (Valtrex) 1 gram tablet Take 1 tablet (1,000 mg) by mouth 3 times a day for 7 days. 21 tablet 0     No current facility-administered medications on file prior to visit.     No images are attached to the encounter.            Assessment/Plan   Diagnoses and all orders for this visit:  Acute bronchitis, unspecified organism  Cellulitis of face  1.  Patient discussion that this could be a flare of ocular rosacea versus the beginning of preseptal cellulitis versus shingles we are going to start her on Valtrex and see her on Friday  2.  Patient will get into see ophthalmology for possibility that this could be shingles of the eye  3.  Patient to call for questions or concerns

## 2024-08-03 ENCOUNTER — TELEPHONE (OUTPATIENT)
Dept: PRIMARY CARE | Facility: CLINIC | Age: 69
End: 2024-08-03
Payer: COMMERCIAL

## 2024-08-03 NOTE — TELEPHONE ENCOUNTER
[facility] pharmacy faxed med refill:  [phone] phone    Atorvastatin Calcium  40 mg  1qd  #90  Refill: 1

## 2024-08-05 DIAGNOSIS — E78.00 ELEVATED LDL CHOLESTEROL LEVEL: ICD-10-CM

## 2024-08-05 RX ORDER — ATORVASTATIN CALCIUM 40 MG/1
40 TABLET, FILM COATED ORAL DAILY
Qty: 90 TABLET | Refills: 1 | Status: SHIPPED | OUTPATIENT
Start: 2024-08-05

## 2025-09-05 DIAGNOSIS — E78.00 ELEVATED LDL CHOLESTEROL LEVEL: ICD-10-CM

## 2025-09-05 RX ORDER — ATORVASTATIN CALCIUM 40 MG/1
40 TABLET, FILM COATED ORAL DAILY
Qty: 90 TABLET | Refills: 1 | Status: SHIPPED | OUTPATIENT
Start: 2025-09-05